# Patient Record
Sex: MALE | URBAN - NONMETROPOLITAN AREA
[De-identification: names, ages, dates, MRNs, and addresses within clinical notes are randomized per-mention and may not be internally consistent; named-entity substitution may affect disease eponyms.]

---

## 2019-04-09 ENCOUNTER — OFFICE VISIT (OUTPATIENT)
Dept: URBAN - NONMETROPOLITAN AREA CLINIC 9 | Facility: CLINIC | Age: 69
End: 2019-04-09
Payer: OTHER GOVERNMENT

## 2019-04-09 DIAGNOSIS — H02.834 DERMATOCHALASIS OF LEFT UPPER EYELID: ICD-10-CM

## 2019-04-09 DIAGNOSIS — H04.123 DRY EYE SYNDROME OF BILATERAL LACRIMAL GLANDS: Primary | ICD-10-CM

## 2019-04-09 DIAGNOSIS — Z96.1 PRESENCE OF INTRAOCULAR LENS: ICD-10-CM

## 2019-04-09 DIAGNOSIS — H02.831 DERMATOCHALASIS OF RIGHT UPPER EYELID: ICD-10-CM

## 2019-04-09 DIAGNOSIS — H01.8 OTHER SPECIFIED INFLAMMATIONS OF EYELID: ICD-10-CM

## 2019-04-09 PROCEDURE — 92004 COMPRE OPH EXAM NEW PT 1/>: CPT | Performed by: OPHTHALMOLOGY

## 2019-04-09 RX ORDER — BACITRACIN 500 [USP'U]/G
OINTMENT OPHTHALMIC
Qty: 1 | Refills: 3 | Status: ACTIVE
Start: 2019-04-09

## 2019-04-09 ASSESSMENT — KERATOMETRY
OS: 43.00
OD: 42.88

## 2019-04-09 ASSESSMENT — VISUAL ACUITY: OD: 20/20

## 2019-04-09 ASSESSMENT — INTRAOCULAR PRESSURE
OD: 12
OS: 12

## 2019-05-15 ENCOUNTER — TESTING ONLY (OUTPATIENT)
Dept: URBAN - NONMETROPOLITAN AREA CLINIC 9 | Facility: CLINIC | Age: 69
End: 2019-05-15
Payer: OTHER GOVERNMENT

## 2019-05-15 PROCEDURE — 92081 LIMITED VISUAL FIELD XM: CPT | Performed by: OPHTHALMOLOGY

## 2019-05-23 ENCOUNTER — OFFICE VISIT (OUTPATIENT)
Dept: URBAN - METROPOLITAN AREA CLINIC 62 | Facility: CLINIC | Age: 69
End: 2019-05-23
Payer: OTHER GOVERNMENT

## 2019-05-23 PROCEDURE — 99214 OFFICE O/P EST MOD 30 MIN: CPT | Performed by: OPHTHALMOLOGY

## 2019-05-23 PROCEDURE — 92285 EXTERNAL OCULAR PHOTOGRAPHY: CPT | Performed by: OPHTHALMOLOGY

## 2019-05-23 ASSESSMENT — INTRAOCULAR PRESSURE
OS: 13
OD: 12

## 2019-05-23 NOTE — IMPRESSION/PLAN
Impression: Dermatochalasis of right upper eyelid: H02.831. Photo Interpretation: supports clinical findings and dx documented in chart. Plan: Discussed diagnosis in detail with patient. Discussed treatment options with patient. HVF reviewed with patient not visually significant and surgical intervention is not required at this time. Recommend 1 year lid eval with HVF t/ut.